# Patient Record
Sex: FEMALE | Race: BLACK OR AFRICAN AMERICAN | NOT HISPANIC OR LATINO | Employment: UNEMPLOYED | URBAN - METROPOLITAN AREA
[De-identification: names, ages, dates, MRNs, and addresses within clinical notes are randomized per-mention and may not be internally consistent; named-entity substitution may affect disease eponyms.]

---

## 2023-03-16 DIAGNOSIS — Z36.89 ENCOUNTER FOR FETAL ANATOMIC SURVEY: Primary | ICD-10-CM

## 2023-03-17 ENCOUNTER — PATIENT MESSAGE (OUTPATIENT)
Dept: MATERNAL FETAL MEDICINE | Facility: CLINIC | Age: 33
End: 2023-03-17
Payer: MEDICAID

## 2023-03-21 ENCOUNTER — OFFICE VISIT (OUTPATIENT)
Dept: MATERNAL FETAL MEDICINE | Facility: CLINIC | Age: 33
End: 2023-03-21
Payer: MEDICAID

## 2023-03-21 DIAGNOSIS — O35.EXX0 PYELECTASIS OF FETUS ON PRENATAL ULTRASOUND: ICD-10-CM

## 2023-03-21 DIAGNOSIS — O28.3 ECHOGENIC INTRACARDIAC FOCUS OF FETUS ON PRENATAL ULTRASOUND: Primary | ICD-10-CM

## 2023-03-21 PROCEDURE — 99499 NO LOS: ICD-10-PCS | Mod: GT,,, | Performed by: GENETIC COUNSELOR, MS

## 2023-03-21 PROCEDURE — 96040 PR GENETIC COUNSELING, EACH 30 MIN: CPT | Mod: GT,,, | Performed by: GENETIC COUNSELOR, MS

## 2023-03-21 PROCEDURE — 99499 UNLISTED E&M SERVICE: CPT | Mod: GT,,, | Performed by: GENETIC COUNSELOR, MS

## 2023-03-21 PROCEDURE — 96040 PR GENETIC COUNSELING, EACH 30 MIN: ICD-10-PCS | Mod: GT,,, | Performed by: GENETIC COUNSELOR, MS

## 2023-03-22 ENCOUNTER — PROCEDURE VISIT (OUTPATIENT)
Dept: MATERNAL FETAL MEDICINE | Facility: CLINIC | Age: 33
End: 2023-03-22
Payer: MEDICAID

## 2023-03-22 ENCOUNTER — OFFICE VISIT (OUTPATIENT)
Dept: MATERNAL FETAL MEDICINE | Facility: CLINIC | Age: 33
End: 2023-03-22
Payer: MEDICAID

## 2023-03-22 VITALS
HEIGHT: 63 IN | WEIGHT: 211.19 LBS | BODY MASS INDEX: 37.42 KG/M2 | SYSTOLIC BLOOD PRESSURE: 100 MMHG | DIASTOLIC BLOOD PRESSURE: 62 MMHG

## 2023-03-22 DIAGNOSIS — Z36.2 ENCOUNTER FOR FOLLOW-UP ULTRASOUND OF FETAL ANATOMY: ICD-10-CM

## 2023-03-22 DIAGNOSIS — O35.EXX0 FETAL RENAL ANOMALY, SINGLE GESTATION: ICD-10-CM

## 2023-03-22 DIAGNOSIS — O35.EXX0 FETAL RENAL ANOMALY, SINGLE GESTATION: Primary | ICD-10-CM

## 2023-03-22 DIAGNOSIS — Z36.89 ENCOUNTER FOR FETAL ANATOMIC SURVEY: ICD-10-CM

## 2023-03-22 DIAGNOSIS — Z36.89 ENCOUNTER FOR ULTRASOUND TO CHECK FETAL GROWTH: ICD-10-CM

## 2023-03-22 PROCEDURE — 3078F PR MOST RECENT DIASTOLIC BLOOD PRESSURE < 80 MM HG: ICD-10-PCS | Mod: CPTII,,, | Performed by: OBSTETRICS & GYNECOLOGY

## 2023-03-22 PROCEDURE — 99203 PR OFFICE/OUTPT VISIT, NEW, LEVL III, 30-44 MIN: ICD-10-PCS | Mod: TH,25,S$PBB, | Performed by: OBSTETRICS & GYNECOLOGY

## 2023-03-22 PROCEDURE — 76811 OB US DETAILED SNGL FETUS: CPT | Mod: PBBFAC | Performed by: OBSTETRICS & GYNECOLOGY

## 2023-03-22 PROCEDURE — 3074F SYST BP LT 130 MM HG: CPT | Mod: CPTII,,, | Performed by: OBSTETRICS & GYNECOLOGY

## 2023-03-22 PROCEDURE — 3074F PR MOST RECENT SYSTOLIC BLOOD PRESSURE < 130 MM HG: ICD-10-PCS | Mod: CPTII,,, | Performed by: OBSTETRICS & GYNECOLOGY

## 2023-03-22 PROCEDURE — 99999 PR PBB SHADOW E&M-EST. PATIENT-LVL III: CPT | Mod: PBBFAC,,, | Performed by: OBSTETRICS & GYNECOLOGY

## 2023-03-22 PROCEDURE — 76811 US MFM PROCEDURE (VIEWPOINT): ICD-10-PCS | Mod: 26,S$PBB,, | Performed by: OBSTETRICS & GYNECOLOGY

## 2023-03-22 PROCEDURE — 3008F BODY MASS INDEX DOCD: CPT | Mod: CPTII,,, | Performed by: OBSTETRICS & GYNECOLOGY

## 2023-03-22 PROCEDURE — 3008F PR BODY MASS INDEX (BMI) DOCUMENTED: ICD-10-PCS | Mod: CPTII,,, | Performed by: OBSTETRICS & GYNECOLOGY

## 2023-03-22 PROCEDURE — 99203 OFFICE O/P NEW LOW 30 MIN: CPT | Mod: TH,25,S$PBB, | Performed by: OBSTETRICS & GYNECOLOGY

## 2023-03-22 PROCEDURE — 99999 PR PBB SHADOW E&M-EST. PATIENT-LVL III: ICD-10-PCS | Mod: PBBFAC,,, | Performed by: OBSTETRICS & GYNECOLOGY

## 2023-03-22 PROCEDURE — 99213 OFFICE O/P EST LOW 20 MIN: CPT | Mod: PBBFAC,TH,25 | Performed by: OBSTETRICS & GYNECOLOGY

## 2023-03-22 PROCEDURE — 1159F MED LIST DOCD IN RCRD: CPT | Mod: CPTII,,, | Performed by: OBSTETRICS & GYNECOLOGY

## 2023-03-22 PROCEDURE — 3078F DIAST BP <80 MM HG: CPT | Mod: CPTII,,, | Performed by: OBSTETRICS & GYNECOLOGY

## 2023-03-22 PROCEDURE — 1159F PR MEDICATION LIST DOCUMENTED IN MEDICAL RECORD: ICD-10-PCS | Mod: CPTII,,, | Performed by: OBSTETRICS & GYNECOLOGY

## 2023-03-22 RX ORDER — SYRINGE WITH NEEDLE, INSULIN
SYRINGE, EMPTY DISPOSABLE MISCELLANEOUS
COMMUNITY
Start: 2023-02-01

## 2023-03-22 NOTE — PROGRESS NOTES
Maternal Fetal Medicine consult    SUBJECTIVE:     Christelle Sanchez is a 33 y.o.  female with IUP at 25w1d who is seen in consultation by MFM for evaluation and management of:  Problem   Fetal Renal Anomaly, Single Gestation       OB HX:  OB History    Para Term  AB Living   6 5 4 1 0 5   SAB IAB Ectopic Multiple Live Births   0 0 0 0 5      # Outcome Date GA Lbr Bassam/2nd Weight Sex Delivery Anes PTL Lv   6 Current            5 Term 19 40w0d  3.175 kg (7 lb) M Vag-Spont  N MARCO A   4 Term 16 40w0d  2.722 kg (6 lb) F Vag-Spont  N MARCO A   3 Term 14 40w0d  2.722 kg (6 lb) M Vag-Spont  N MARCO A   2 Term 13 40w0d  2.722 kg (6 lb) F Vag-Spont  N MARCO A   1  11/29/10 34w0d  1.814 kg (4 lb) M Vag-Spont   MARCO A      Birth Comments: PTL per patient       History reviewed. No pertinent past medical history.  History reviewed. No pertinent surgical history.  Family history: negative for birth defects, recurrent miscarriages, chromosomal abnormalities.   Social History     Tobacco Use    Smoking status: Former     Types: Cigarettes   Substance Use Topics    Alcohol use: Not Currently    Drug use: Not Currently     Comment: hx THC use     Review of patient's allergies indicates:  No Known Allergies  Medications:  PNV    Aneuploidy screening:   None, declines    Records reviewed    OBJECTIVE:     Blood Pressure: 100/62  Weight: 96kg    Ultrasound performed. See viewpoint for full ultrasound report.  A detailed fetal anatomic ultrasound examination was performed for the following high risk indication: suspected fetal renal abnormality.  Urinary tract dilation (UTD) was identified on ultrasound today, with renal pelvis dilation measuring 4.7 mm on the right kidney and 4.8mm on the left kidney. Otherwise, renal anatomy appears unremarkable. The parenchyma of the kidney appears normal. The ureter was not visualized. The bladder appears normal. This is consistent with UTD A1 (low risk).   No other  fetal structural malformations are identified; however, fetal imaging is incomplete today.   Fetal size today is consistent with established gestational age.   Placental location is anterior without evidence of previa.     ASSESSMENT/PLAN:     33 y.o.  female with IUP at 25w1d    Fetal renal anomaly, single gestation  Fetal urinary tract dilation  We discussed today's ultrasound findings. I reviewed with the patient today that about 3% of normal fetuses have the finding of urinary tract dilation. We discussed the causes of UTD including an obstructive process, urinary tract reflux, a marker for other conditions such as aneuploidy, and also that this can be a normal variant that resolves. There were no other structural abnormalities noted on ultrasound, therefore aneuploidy is unlikely.    The patient's classification of urinary tract dilation is A1.  Most cases of UTD resolve spontaneously and do not require evaluation or intervention postnatally. If UTD persists postnatally, the  may be at increased risk for kidney infections and kidney disease.   I recommend the patient undergo repeat ultrasound assessment at 32 weeks. If the UTD is persistent at that time, would recommend  assessment by the pediatric team with possible referral to pediatric urology.  The patient was offered cfDNA screening and declined.     FOLLOW UP:   The patient has been scheduled for follow up ultrasound in 3-4 weeks to complete the anatomy survey. She was also scheduled for a follow up ultrasound at 32-34 weeks to re-evaluate the fetal  tract.     Today I have spent 30 minutes in the care of the patient. This includes face to face time and non-face to face time preparing to see the patient (eg, review of tests), obtaining and/or reviewing separately obtained history, documenting clinical information in the electronic or other health record, independently interpreting results and communicating results to the  patient/family/caregiver, or care coordination.     Kate Cox MD  Maternal Fetal Medicine

## 2023-03-22 NOTE — PROGRESS NOTES
Office Visit - Genetic Counseling Evaluation   Christelle Sanchez  : 1990  MRN: [id number]  REFERRING PROVIDER: No ref. provider found  PARTNER NAME: Jameel    DATE OF SERVICE: 3/21/23  TIME SPENT: 35 min    REASON FOR CONSULT:  Christelle Sanchez, a 33 y.o. female with bilateral dilated renal pelvis and EIF on outside ultrasound was referred for genetic counseling to discuss these findings. She has not had any aneuploidy screening. She came to the appointment with her youngest son.     OBSETRIC HISTORY   AGE AT NICK: 33y  NICK: 2023  GESTATION: [place]  GESTATIONAL AGE:24w2d    PREGNANCY HISTORY  G1: 34w0d-   G2: 40w-   G3: 40w-   G4: 40w-   G5- 40w-   G6: current    MEDICAL HISTORY:  MEDICATIONS/EXPOSURES: not reviewed today  There is no problem list on file for this patient.    No current outpatient medications on file.     FAMILY HISTORY:  Please see scanned pedigree in patient's chart under media. Patient's ancestry is unknown. FOB ancestry is unknown. Consanguinity was denied.     This is the first pregnancy for Christelle and her partner Jameel. Neither of them have any significant health problems. Christelle has five other children and Jameel has 9 other children with previous partners. One of his children has autism otherwise they are all healthy. Christelle has four siblings two are well. Her nieces and nephews are also well. She does report one nephew who passed away at 5y due to brain cancer. Her mother has kidney problems and a history of a blood clot. Her father is healthy. Jameel has two sisters, one of whom passed away from an unknown cancer. His nieces and nephews are healthy. His mother is alive and doing well. His father has passed away due to reasons unknown to Christelle.     Additional history negative for multiple miscarriages/stillbirth, developmental delay/intellectual disability, and known genetic disorders. Complete pedigree will be linked to this encounter and can be viewed under the Media tab.  The information provided is based on the patient and/or their reproductive partner's recollection of the family history and in the absence of complete medical records. If the family history changes or if more information is obtained, they were asked to contact us as this may alter the recommendations or impression of the family history.     PAST TESTING  Patient carrier screening: none in chart  Reproductive partner carrier screening: NA  Parental Karyotypes: NA    PREGNANCY TESTING  cfDNA for aneuploidy: Pt declined previously  Diagnostic testing: NA  Fetal karyotype: NA    COUNSELING:   Echogenic Intracardiac Focus and Bilateral Renal Pyelectasis  Christelle's recent ultrasound noted an isolated echogenic intracardiac focus (EIF) and bilateral renal dilation (5mm). She has not previously completed aneuploidy screening for this pregnancy. The presence of this finding increases her risk to have a child with Down Syndrome. Her a priori risk is 1 in 540 and updated risk in the presence of an EIF and dilated renal pelvis is 1 in 103.     We discussed echogenic intracardiac focus. This is not a structural abnormality that requires post- follow-up and is not associated with congenital heart disease. It is usually seen as a normal variant in about 5% of pregnancies. There is a higher incidence of EIF in fetuses with Down Syndrome than in chromosomally typical fetus' therefore it is a risk factor for Down syndrome (Likelihood ratio of 2). If isolated, the overwhelming majority (99%) of fetuses found to have an EIF will not have Down syndrome.     In a woman with other risk factors for Down syndrome (advanced maternal age, elevated quad screen risk or presence of other markers), the presence of an echogenic focus may increase the relative risk of Down syndrome. If the patient is close to age 35, the presence of an echogenic focus may increase the risk for Down syndrome to a level seen in women 35 years old or older. In a  younger woman (< age 35) or in a woman without other risk factors or markers for Down syndrome, the significance of an isolated echogenic focus is uncertain and often not significant.     Renal Pyelectasis or urinary tract dilation (UTD) occurs in 1-2% of pregnancies, is often transient and a normal variant. It may indicate an underlying urinary tract anomaly or may be indicative of trisomy 21. UTD between 4 and 7 mm in the second trimester of pregnancy resolves in approximately 80% of cases. After a finding of isolated UTD on ultrasound cfDNA screening is recommended. If cfDNA has already been completed and is normal no further testing is recommended. However, if seen with another marker or anomaly diagnostic testing can be considered.      We reviewed the options of NIPT and amniocentesis. We reviewed the sensitivity and specificity and detection rates for Trisomy 21 for each of these tests. We discussed a 1 in 900 risk of pregnancy related loss or complication with an amniocentesis.     After our discussion Christelle is likely to elect an amniocentesis. She will decide after her ultrasound and conversation with [facility] on 3/22.     DISCUSSION & IMPRESSION:  Christelle is a 33 y.o. female with an EIF and bilateral dilated renal pelvis. She has not had any screening. She has discussed these findings with her primary OB previously but she was not offered screening. She believes that she may want an amniocentesis and is less interested in screening. She does not plan to end the pregnancy based on a genetic diagnosis at this time so her decision making is based on information gathering.     TESTING OPTIONS  Diagnostic Testing: Amniocentesis  Carrier Screening: Not discussed today  cfDNA screening: Reviewed today  Pregnancy Options: Termination was discussed briefly  Recurrence Risk: Age related risk if no chromosome anomalies, as high as 1% if chromosome anomalies.   Procedures/labs DECLINED today: No decisions were made  today    Christelle stated that she is considering an amniocentesis. She does not plan to end the pregnancy based on a genetic diagnosis at this time so her decision making is based on information gathering.     We reviewed Christelle's medical and family history. We discussed basics of genetics and the ultrasound findings above. Christelle was understanding of the information discussed in clinic and all questions were answered.     Please see Dr. Cox's note for detailed information regarding ultrasound findings, plan of care and diagnostic considerations.    RECOMMENDATIONS:  Per pt decision after MFM ultrasound tomorrow (3/22)    Erika Arzate MS, [facility] [place]  Licensed, Certified Genetic Counselor  [facility]    The patient location is: Home ([place])  The chief complaint leading to consultation is: ultrasound findings    Visit type: audiovisual    Face to Face time with patient: 35 min  55 minutes of total time spent on the encounter, which includes face to face time and non-face to face time preparing to see the patient (eg, review of tests), Obtaining and/or reviewing separately obtained history, Documenting clinical information in the electronic or other health record, Independently interpreting results (not separately reported) and communicating results to the patient/family/caregiver, or Care coordination (not separately reported).         Each patient to whom he or she provides medical services by telemedicine is:  (1) informed of the relationship between the physician and patient and the respective role of any other health care provider with respect to management of the patient; and (2) notified that he or she may decline to receive medical services by telemedicine and may withdraw from such care at any time.

## 2023-03-22 NOTE — ASSESSMENT & PLAN NOTE
Fetal urinary tract dilation  We discussed today's ultrasound findings. I reviewed with the patient today that about 3% of normal fetuses have the finding of urinary tract dilation. We discussed the causes of UTD including an obstructive process, urinary tract reflux, a marker for other conditions such as aneuploidy, and also that this can be a normal variant that resolves. There were no other structural abnormalities noted on ultrasound, therefore aneuploidy is unlikely.    The patient's classification of urinary tract dilation is A1.  Most cases of UTD resolve spontaneously and do not require evaluation or intervention postnatally. If UTD persists postnatally, the  may be at increased risk for kidney infections and kidney disease.   I recommend the patient undergo repeat ultrasound assessment at 32 weeks. If the UTD is persistent at that time, would recommend  assessment by the pediatric team with possible referral to pediatric urology.

## 2023-04-17 ENCOUNTER — PROCEDURE VISIT (OUTPATIENT)
Dept: MATERNAL FETAL MEDICINE | Facility: CLINIC | Age: 33
End: 2023-04-17
Payer: MEDICAID

## 2023-04-17 DIAGNOSIS — Z36.2 ENCOUNTER FOR FOLLOW-UP ULTRASOUND OF FETAL ANATOMY: ICD-10-CM

## 2023-04-17 DIAGNOSIS — O35.EXX0 FETAL RENAL ANOMALY, SINGLE GESTATION: ICD-10-CM

## 2023-04-17 DIAGNOSIS — Z36.89 ENCOUNTER FOR ULTRASOUND TO CHECK FETAL GROWTH: ICD-10-CM

## 2023-04-17 PROCEDURE — 76816 OB US FOLLOW-UP PER FETUS: CPT | Mod: S$GLB,,, | Performed by: OBSTETRICS & GYNECOLOGY

## 2023-04-17 PROCEDURE — 76816 US MFM PROCEDURE (VIEWPOINT): ICD-10-PCS | Mod: S$GLB,,, | Performed by: OBSTETRICS & GYNECOLOGY
